# Patient Record
Sex: FEMALE | Race: WHITE | URBAN - METROPOLITAN AREA
[De-identification: names, ages, dates, MRNs, and addresses within clinical notes are randomized per-mention and may not be internally consistent; named-entity substitution may affect disease eponyms.]

---

## 2021-07-09 ENCOUNTER — OFFICE VISIT (OUTPATIENT)
Dept: URBAN - METROPOLITAN AREA CLINIC 32 | Facility: CLINIC | Age: 46
End: 2021-07-09
Payer: COMMERCIAL

## 2021-07-09 DIAGNOSIS — H10.413 CONJUNCTIVITIS - ALLERGIC: Primary | ICD-10-CM

## 2021-07-09 PROCEDURE — 99203 OFFICE O/P NEW LOW 30 MIN: CPT | Performed by: OPTOMETRIST

## 2021-07-09 RX ORDER — TOBRAMYCIN 3 MG/ML
0.3 % SOLUTION/ DROPS OPHTHALMIC
Qty: 5 | Refills: 0 | Status: ACTIVE
Start: 2021-07-09

## 2021-07-09 RX ORDER — DOXYCYCLINE HYCLATE 100 MG/1
100 MG CAPSULE, GELATIN COATED ORAL
Qty: 60 | Refills: 1 | Status: ACTIVE
Start: 2021-07-09

## 2021-07-09 RX ORDER — DUREZOL 0.5 MG/ML
0.05 % EMULSION OPHTHALMIC
Qty: 5 | Refills: 1 | Status: INACTIVE
Start: 2021-07-09 | End: 2021-08-07

## 2021-07-09 ASSESSMENT — KERATOMETRY
OS: 45.88
OD: 46.38

## 2021-07-09 ASSESSMENT — INTRAOCULAR PRESSURE
OD: 12
OS: 15

## 2021-07-09 NOTE — IMPRESSION/PLAN
Impression: Conjunctivitis - Allergic: H10.413.  Plan: ES AG Durezol BID OU tobramycin QID OU doxy PO

## 2021-07-27 ENCOUNTER — OFFICE VISIT (OUTPATIENT)
Dept: URBAN - METROPOLITAN AREA CLINIC 34 | Facility: CLINIC | Age: 46
End: 2021-07-27
Payer: COMMERCIAL

## 2021-07-27 DIAGNOSIS — H04.213 EPIPHORA DUE TO EXCESS LACRIMATION, BILATERAL LACRIMAL GLANDS: Primary | ICD-10-CM

## 2021-07-27 PROCEDURE — 92285 EXTERNAL OCULAR PHOTOGRAPHY: CPT | Performed by: OPHTHALMOLOGY

## 2021-07-27 PROCEDURE — 99204 OFFICE O/P NEW MOD 45 MIN: CPT | Performed by: OPHTHALMOLOGY

## 2021-07-27 NOTE — IMPRESSION/PLAN
Impression: Epiphora due to excess lacrimation, bilateral lacrimal glands: H04.213.  Plan: chronic 6 mo tearing, irrigated freely today on testing; does demonstrate diffuse papillary conjunctivitis uncertain cause, will refer to ant. seg